# Patient Record
Sex: MALE | Race: OTHER | NOT HISPANIC OR LATINO | URBAN - METROPOLITAN AREA
[De-identification: names, ages, dates, MRNs, and addresses within clinical notes are randomized per-mention and may not be internally consistent; named-entity substitution may affect disease eponyms.]

---

## 2018-07-10 ENCOUNTER — EMERGENCY (EMERGENCY)
Facility: HOSPITAL | Age: 23
LOS: 1 days | Discharge: HOME | End: 2018-07-10
Attending: EMERGENCY MEDICINE | Admitting: EMERGENCY MEDICINE
Payer: COMMERCIAL

## 2018-07-10 VITALS
TEMPERATURE: 98 F | HEART RATE: 84 BPM | OXYGEN SATURATION: 99 % | SYSTOLIC BLOOD PRESSURE: 139 MMHG | DIASTOLIC BLOOD PRESSURE: 82 MMHG | RESPIRATION RATE: 19 BRPM

## 2018-07-10 PROBLEM — Z00.00 ENCOUNTER FOR PREVENTIVE HEALTH EXAMINATION: Status: ACTIVE | Noted: 2018-07-10

## 2018-07-10 PROCEDURE — 99282 EMERGENCY DEPT VISIT SF MDM: CPT | Mod: 25

## 2018-07-10 PROCEDURE — 11740 EVACUATION SUBUNGUAL HMTMA: CPT | Mod: F9

## 2018-07-10 NOTE — ED PROVIDER NOTE - CONSTITUTIONAL, MLM
normal... Well appearing, well nourished, awake, alert, oriented to person, place, time/situation and in no apparent distress. Well appearing, well nourished, awake, alert,and in no apparent distress.

## 2018-07-10 NOTE — ED PROCEDURE NOTE - GENERAL PROCEDURE DETAILS
Electrocautery used to trephinate R 5th finger. Blood expressed from fingernail, digit dressed with gauze. Pulse, motor, sensation fully intact before and after procedure. Electrocautery used to trephinate R 5th fingernail. Blood expressed from fingernail, digit dressed with gauze. Pulse, motor, sensation fully intact before and after procedure.

## 2018-07-10 NOTE — ED PROVIDER NOTE - MEDICAL DECISION MAKING DETAILS
Cabot: 23M with R 5th digit subungual hematoma.  Now s/p  trephination with relief of sx.  Will discharge home.

## 2018-07-10 NOTE — ED PROVIDER NOTE - OBJECTIVE STATEMENT
22y/o M with no pertinent PMHx, presents to the ED s/p R 5th finger injury yesterday, now c/o worsening pain. Pt closed his R 5th finger in a car door yesterday night. Denies numbness/tingling, fevers/chills, any other complaints. NKDA. 22y/o M with no pertinent PMHx, presents to the ED s/p R 5th finger injury yesterday, now c/o worsening pain. Pt closed his R 5th finger in a car door yesterday night. Denies numbness/tingling, fevers/chills, any other complaints. NKDA. Right handed.

## 2018-07-10 NOTE — ED PROVIDER NOTE - UPPER EXTREMITY EXAM, RIGHT
100% subungual hematoma to the R 5th digit with mild TTP. Neurovascularly intact distally./TENDERNESS TENDERNESS/80% subungual hematoma to the R 5th digit with mild TTP. Neurovascularly intact distally.  No lacerations.

## 2018-07-10 NOTE — ED PROVIDER NOTE - NS_ ATTENDINGSCRIBEDETAILS _ED_A_ED_FT
I, Jennifer Cabot, MD, have performed a history and physical exam of the patient. I reviewed the scribe's note and agree with the documented findings and plan of care. My medical decision making and observations are found above.

## 2018-08-01 ENCOUNTER — APPOINTMENT (OUTPATIENT)
Dept: RADIOLOGY | Facility: IMAGING CENTER | Age: 23
End: 2018-08-01
Payer: COMMERCIAL

## 2018-08-01 ENCOUNTER — OUTPATIENT (OUTPATIENT)
Dept: OUTPATIENT SERVICES | Facility: HOSPITAL | Age: 23
LOS: 1 days | End: 2018-08-01
Payer: COMMERCIAL

## 2018-08-01 ENCOUNTER — APPOINTMENT (OUTPATIENT)
Dept: PLASTIC SURGERY | Facility: CLINIC | Age: 23
End: 2018-08-01
Payer: COMMERCIAL

## 2018-08-01 VITALS
HEART RATE: 45 BPM | OXYGEN SATURATION: 99 % | WEIGHT: 130 LBS | RESPIRATION RATE: 16 BRPM | BODY MASS INDEX: 23.04 KG/M2 | DIASTOLIC BLOOD PRESSURE: 88 MMHG | HEIGHT: 63 IN | SYSTOLIC BLOOD PRESSURE: 131 MMHG

## 2018-08-01 DIAGNOSIS — Z78.9 OTHER SPECIFIED HEALTH STATUS: ICD-10-CM

## 2018-08-01 DIAGNOSIS — S69.92XA UNSPECIFIED INJURY OF LEFT WRIST, HAND AND FINGER(S), INITIAL ENCOUNTER: ICD-10-CM

## 2018-08-01 PROCEDURE — 73140 X-RAY EXAM OF FINGER(S): CPT

## 2018-08-01 PROCEDURE — 73140 X-RAY EXAM OF FINGER(S): CPT | Mod: 26,RT

## 2018-08-01 PROCEDURE — 99204 OFFICE O/P NEW MOD 45 MIN: CPT

## 2018-08-01 RX ORDER — TRIAMCINOLONE ACETONIDE 1 MG/G
0.1 CREAM TOPICAL
Qty: 454 | Refills: 0 | Status: ACTIVE | COMMUNITY
Start: 2018-05-09

## 2018-08-01 RX ORDER — ERGOCALCIFEROL 1.25 MG/1
1.25 MG CAPSULE, LIQUID FILLED ORAL
Qty: 4 | Refills: 0 | Status: ACTIVE | COMMUNITY
Start: 2018-07-09

## 2018-08-01 RX ORDER — DOXYCYCLINE HYCLATE 100 MG/1
100 CAPSULE ORAL
Qty: 30 | Refills: 0 | Status: ACTIVE | COMMUNITY
Start: 2018-05-09

## 2018-08-14 PROBLEM — Z78.9 DENIES ALCOHOL CONSUMPTION: Status: ACTIVE | Noted: 2018-08-14

## 2018-08-14 PROBLEM — S69.92XA INJURY OF FINGER OF LEFT HAND, INITIAL ENCOUNTER: Status: ACTIVE | Noted: 2018-08-01
